# Patient Record
Sex: FEMALE | Race: WHITE | NOT HISPANIC OR LATINO | Employment: UNEMPLOYED | URBAN - METROPOLITAN AREA
[De-identification: names, ages, dates, MRNs, and addresses within clinical notes are randomized per-mention and may not be internally consistent; named-entity substitution may affect disease eponyms.]

---

## 2020-06-22 ENCOUNTER — TELEPHONE (OUTPATIENT)
Dept: HEMATOLOGY/ONCOLOGY | Facility: CLINIC | Age: 85
End: 2020-06-22

## 2020-06-22 NOTE — TELEPHONE ENCOUNTER
LVM with Autumn--Dr. Stacy's nurse re: obtaining apt for pt.  Left contact information to call back

## 2020-06-22 NOTE — TELEPHONE ENCOUNTER
----- Message from Zev Barreto sent at 6/22/2020  2:39 PM CDT -----  Type: Needs Medical Advice  Who Called:  Stefany with   Symptoms (please be specific):    How long has patient had these symptoms:    Pharmacy name and phone #:    Best Call Back Number:   Additional Information: requesting appointment for patient call back to confirm

## 2020-06-23 ENCOUNTER — TELEPHONE (OUTPATIENT)
Dept: HEMATOLOGY/ONCOLOGY | Facility: CLINIC | Age: 85
End: 2020-06-23

## 2020-07-09 NOTE — PROGRESS NOTES
INITIAL CONSULTATION     DATE: 07/23/2020  Patient Name: Kiesha Madera  Referred by: Edgardo Stacy MD  Reason for referral: low blood coutns      Subjective:       Patient ID: Kiesha Madera is a 84 y.o. female.    Chief Complaint: New Patient    HPI      REVIEW OF RECORDS PRIOR TO VISIT SHOW:  03/26/2020-CBC shows white count 3.2, hemoglobin 11.5, platelets 100.  Differential shows 66.8% granulocytes, 22.9% lymphocyte, 7.7% monocyte elevated, years in bases are normal.  The ANC is 2.1 her MCV is 102  06/04/2020-CBC shows white count 3.1, hemoglobin 11.8, platelets 87.  ANC is 2.0, absolute lymphs and monos are low.  Her MCV is 104. Her CMP shows a creatinine of 1.71, calcium low at 8.6, albumin low at 3.3.  TSH is mildly elevated at 5.0 with normal free T4.  Her LDL is 28 and total cholesterol 97.  06/08/2020-annual PCP wellness exam-referred to Hematology    Noted she has a medical history including coronary artery disease low magnesium hypertension diabetes type 2 acid reflux hyperlipidemia DOW (which would be a common etiology of  her low blood counts), bilateral renal stones bradycardia chronic kidney disease stage 4, cirrhosis is mentioned, left carotid bruit, secondary hyperparathyroidism, vitamin-D deficiency.  Noted BMI 24.5, weight 143.      She sees Dr. Mikie gordillo for Nephrology, Dr. Prather:  For cardiology, Dr. Orellana for pulmonary.  She has left AV graft implanted but has never received dialysis.  Complaints of left sciatic pain.  Complaints of chronic hemorrhoids.  Type 2 diabetes taking Tresiba.  Sugars usually range 90 to 120.  Last A1c was 6.3%.  Medications include Breo, multivitamin, magnesium supplement, Tylenol, Zofran, aspirin, atorvastatin, paricalcitol, sodium bicarbonate.    PATIENT REPORTS TODAY ON INITIAL VISIT:    Confirms above to her knowledge with help of sister.   DM since age 51yo - they numbness none, balance none  CKD - they report told this after heart problem  Fistula placed  2016 - has not needed, was ckd 4 now 3.   Pulmonary fibrosis - 4 years - inhyaler, takes ofev with great result.    Chronic pain - yes - left hip, sciatic nerve radiates to foot. Stimulator helps but not much.   This is her biggest active issue in regards to her health, biggest limitation. She states takes 3 hours to mop floor, stops due to pain. Last stimulator worked better.     Diarrhea chronic - medication related SE, imdoium helps.   No blood in stool or urine now - she ahs had blood in urine once, no gross blood, just trace on UA. Kidney stones in past. Urology workup is underway.     No infections.   No major hospitalizations, dehyrdated and has been inpatient for that before.   Stent placed cardiac - that was last hospitalization - 11/2018. One stent.     Easy bruising and bleeding - mild bruising, no bleeding.     No smoking or drinking in past.     Low salt, diabetic diet, added some things back.   Told to increase meat.     Lost weight intentional with diet change - 198lbs    Her sciatic nerve left is her biggest limitation.     She does take a monthly b 12 shot        Past Medical History:   Diagnosis Date    Chronic kidney disease, stage IV (severe)     Chronic renal failure     Coronary artery disease     GERD (gastroesophageal reflux disease)     Hyperlipidemia     Hyperparathyroidism, secondary renal     Hypertension, essential     Hypomagnesemia     Iron deficiency     Type 2 diabetes mellitus with hypoglycemia and coma     Vitamin D deficiency, unspecified      Past Surgical History:   Procedure Laterality Date    APPENDECTOMY      CHOLECYSTECTOMY      KNEE SURGERY      TONSILLECTOMY       Social History     Socioeconomic History    Marital status: Single     Spouse name: Not on file    Number of children: Not on file    Years of education: Not on file    Highest education level: Not on file   Occupational History    Occupation: Retired   Social Needs    Financial resource  "strain: Not on file    Food insecurity     Worry: Not on file     Inability: Not on file    Transportation needs     Medical: Not on file     Non-medical: Not on file   Tobacco Use    Smoking status: Never Smoker    Smokeless tobacco: Never Used   Substance and Sexual Activity    Alcohol use: No    Drug use: No    Sexual activity: Not on file   Lifestyle    Physical activity     Days per week: Not on file     Minutes per session: Not on file    Stress: Not on file   Relationships    Social connections     Talks on phone: Not on file     Gets together: Not on file     Attends Rastafari service: Not on file     Active member of club or organization: Not on file     Attends meetings of clubs or organizations: Not on file     Relationship status: Not on file   Other Topics Concern    Not on file   Social History Narrative    Not on file     Family History   Problem Relation Age of Onset    Coronary artery disease Mother     Hypertension Mother     Diabetes Mother     Hypertension Sister     Hypertension Brother     Diabetes Brother        Current Outpatient Medications   Medication Sig Dispense Refill    ALBUTEROL INHL Inhale into the lungs.      aspirin (ECOTRIN) 81 MG EC tablet Take 81 mg by mouth once daily.      atorvastatin (LIPITOR) 40 MG tablet Take 40 mg by mouth once daily.      BD SAFETYGLIDE SYRINGE 3 mL 23 x 1" Syrg PT TO USE TO ADMINISTER VITAMIN B 12 IM MONTHLY CHANGE NEEDLE AFTER DRAWING UP MED  1    BD ULTRA-FINE MINI PEN NEEDLE 31 gauge x 3/16" Ndle USE AS DIRECTED  4    beta-carotene,A,-vits C,E/mins (VISION-MACIEL ORAL) Take by mouth.      cyanocobalamin, vitamin B-12, 1,000 mcg/mL Kit Inject as directed.      ergocalciferol (ERGOCALCIFEROL) 50,000 unit Cap TAKE 1 CAPSULE (50,000 UNITS TOTAL) BY MOUTH EVERY 30 DAYS. WITH FOOD 3 capsule 3    insulin degludec (TRESIBA FLEXTOUCH U-100 SUBQ) Inject into the skin.      insulin glargine (LANTUS) 100 unit/mL injection Inject into " the skin every evening.      multivitamin/iron/folic acid (COMPLETE WOMEN ORAL) Take by mouth.      nintedanib (OFEV) 100 mg Cap Take by mouth 2 (two) times daily.      omeprazole (PRILOSEC) 20 MG capsule Take 20 mg by mouth once daily.      ondansetron (ZOFRAN) 4 MG tablet Take 4 mg by mouth every 8 (eight) hours as needed.      ondansetron (ZOFRAN-ODT) 4 MG TbDL PLACE 1 TABLETS (4 MG) ON TOP OF THE TONGUE EVERY 8 HOURS 30 tablet 2    sodium bicarbonate 325 MG tablet TAKE 1 TABLET BY MOUTH EVERYDAY AT BEDTIME 90 tablet 3    traMADol (ULTRAM) 50 mg tablet Take 50 mg by mouth every evening.      TRUE METRIX GLUCOSE TEST STRIP Strp TEST 2 TIMES A DAY  3    losartan (COZAAR) 25 MG tablet Take 1 tablet (25 mg total) by mouth every evening. 90 tablet 3     No current facility-administered medications for this visit.      ALLERGIES REVIEWED    Review of Systems    Constitutional: Negative for activity change, appetite change, negative fatigue, fever and unexpected weight change.   HENT: Negative for mouth sores and sore throat.    Respiratory: Negative for cough and shortness of breath.    Cardiovascular: Negative for chest pain, palpitations and leg swelling.   Gastrointestinal: Negative for abdominal pain, constipation and diarrhea.   Genitourinary: Negative for dysuria and frequency.   Musculoskeletal: Negative for back pain and joint swelling.   Neurological: Negative for weakness, light-headedness and numbness.   Hematological: Does not bruise/bleed easily.   Skin: no rash, no lesions, no itching    Objective:      /76 (BP Location: Right arm, Patient Position: Sitting, BP Method: Medium (Automatic))   Pulse 67   Temp 97.7 °F (36.5 °C) (Temporal)   Resp 20   Wt 67.8 kg (149 lb 7.6 oz)   SpO2 100%   BMI 25.66 kg/m²    Wt Readings from Last 25 Encounters:   07/23/20 67.8 kg (149 lb 7.6 oz)   11/01/19 65.8 kg (145 lb)   05/03/19 67.6 kg (149 lb)   10/29/18 67.6 kg (149 lb)   05/18/18 65.3 kg (144  lb)       Physical Exam    Constitutional: She is oriented to person, place, and time. No distress.   HENT:   Mouth/Throat: Oropharynx is clear and moist. No oropharyngeal exudate.   Eyes: Conjunctivae and EOM are normal.   Neck: Normal range of motion. Neck supple.   Cardiovascular: Normal rate, regular rhythm, normal heart sounds and intact distal pulses.    Pulmonary/Chest: Effort normal and breath sounds normal. She has no wheezes. She has no rales.   Abdominal: Soft. Bowel sounds are normal. She exhibits no distension. There is no tenderness.   Musculoskeletal: She exhibits no edema or tenderness.   Lymphadenopathy:     She has no cervical adenopathy.   Neurological: She is alert and oriented to person, place, and time. She has normal strength. No cranial nerve deficit or sensory deficit.   Skin: Skin is warm and dry. No rash noted. No erythema.   Psychiatric: She has a normal mood and affect. Her speech is normal.   Nursing note and vitals reviewed.    No results found for this or any previous visit (from the past 72 hour(s)).    Assessment:       1. Pancytopenia          Patient is a  84 y.o. female here with diagnosis pancytopenia      Discussed differential diagnosis, work-up, in great detail with patient and family.    No major clinical signs of concern, could be related to underlying liver disease, bone marrow dysfunction not ruled out but cytopenias are mild and ANC is normal which is good. Reviewed this at length with patient and sister and reviewed rationale for full lab work-up.           Plan:       Kiesha was seen today for new patient.    Diagnoses and all orders for this visit:    Pancytopenia  -     Ferritin; Future  -     Iron and TIBC; Future  -     Reticulocytes; Future  -     Sedimentation rate; Future  -     C-Reactive Protein; Future  -     Vitamin B12; Future  -     Folate; Future  -     Protein electrophoresis, serum; Future  -     CBC auto differential; Future  -     Pathologist  Interpretation Differential; Future  -     Platelet Count, Blue Top; Future  -     Comprehensive metabolic panel; Future  -     HIV 1/2 Ag/Ab (4th Gen); Future  -     Hepatitis C Antibody; Future  -     Sedimentation rate; Future  -     C-Reactive Protein; Future  -     Lactate Dehydrogenase; Future  -     Haptoglobin; Future  -     Fibrinogen; Future  -     APTT; Future  -     Protime-INR; Future    Other orders  -     Cancel: Direct antiglobulin test; Future  -     Cancel: US Abdomen Limited; Future  -     Cancel: X-Ray Chest PA And Lateral; Future  -     Cancel: D dimer, quantitative; Future            PLAN:    Labs today.     MD vis in 2-3 weeks to review results.     Exercise/nutrition  Important to keep follow-up with PCP and nephro    Discussed plan above in great detail with patient and sister and all questions answered to their satisfaction. Proceed with plan above.       Thank you for the referral. Please call with any questions.           Jessica Chu M.D.  Hematology Oncology  [facility]

## 2020-07-23 ENCOUNTER — OFFICE VISIT (OUTPATIENT)
Dept: HEMATOLOGY/ONCOLOGY | Facility: CLINIC | Age: 85
End: 2020-07-23
Payer: MEDICARE

## 2020-07-23 ENCOUNTER — LAB VISIT (OUTPATIENT)
Dept: LAB | Facility: HOSPITAL | Age: 85
End: 2020-07-23
Attending: INTERNAL MEDICINE
Payer: MEDICARE

## 2020-07-23 VITALS
DIASTOLIC BLOOD PRESSURE: 76 MMHG | BODY MASS INDEX: 25.66 KG/M2 | HEART RATE: 67 BPM | RESPIRATION RATE: 20 BRPM | SYSTOLIC BLOOD PRESSURE: 126 MMHG | TEMPERATURE: 98 F | OXYGEN SATURATION: 100 % | WEIGHT: 149.5 LBS

## 2020-07-23 DIAGNOSIS — D61.818 PANCYTOPENIA: Primary | ICD-10-CM

## 2020-07-23 DIAGNOSIS — D61.818 PANCYTOPENIA: ICD-10-CM

## 2020-07-23 LAB
ALBUMIN SERPL BCP-MCNC: 3.6 G/DL (ref 3.5–5.2)
ALP SERPL-CCNC: 83 U/L (ref 38–145)
ALT SERPL W/O P-5'-P-CCNC: 19 U/L (ref 0–35)
ANION GAP SERPL CALC-SCNC: 4 MMOL/L (ref 8–16)
APTT PPP: 33.4 SEC (ref 24.6–36.7)
AST SERPL-CCNC: 51 U/L (ref 14–36)
BASOPHILS # BLD AUTO: 0.01 K/UL (ref 0–0.2)
BASOPHILS NFR BLD: 0.2 % (ref 0–1.9)
BILIRUB SERPL-MCNC: 0.8 MG/DL (ref 0.2–1.3)
BUN SERPL-MCNC: 26 MG/DL (ref 7–18)
CALCIUM SERPL-MCNC: 9.6 MG/DL (ref 8.4–10.2)
CHLORIDE SERPL-SCNC: 112 MMOL/L (ref 95–110)
CO2 SERPL-SCNC: 23 MMOL/L (ref 22–31)
CREAT SERPL-MCNC: 1.74 MG/DL (ref 0.5–1.4)
CRP SERPL-MCNC: <0.5 MG/DL (ref 0–0.9)
CRP SERPL-MCNC: <0.5 MG/DL (ref 0–0.9)
DIFFERENTIAL METHOD: ABNORMAL
EOSINOPHIL # BLD AUTO: 0.1 K/UL (ref 0–0.5)
EOSINOPHIL NFR BLD: 2.4 % (ref 0–8)
ERYTHROCYTE [DISTWIDTH] IN BLOOD BY AUTOMATED COUNT: 14.6 % (ref 11.5–14.5)
ERYTHROCYTE [SEDIMENTATION RATE] IN BLOOD BY PHOTOMETRIC METHOD: 25 MM/HR (ref 0–29)
ERYTHROCYTE [SEDIMENTATION RATE] IN BLOOD BY PHOTOMETRIC METHOD: 25 MM/HR (ref 0–29)
EST. GFR  (AFRICAN AMERICAN): 31 ML/MIN/1.73 M^2
EST. GFR  (NON AFRICAN AMERICAN): 27 ML/MIN/1.73 M^2
FERRITIN SERPL-MCNC: 66 NG/ML (ref 12–264)
FIBRINOGEN PPP-MCNC: 411 MG/DL (ref 234–538)
FOLATE SERPL-MCNC: >20 NG/ML (ref 4–24)
GLUCOSE SERPL-MCNC: 108 MG/DL (ref 70–110)
HAPTOGLOB SERPL-MCNC: 73 MG/DL (ref 30–200)
HCT VFR BLD AUTO: 38.1 % (ref 37–48.5)
HCV AB SERPL QL IA: NEGATIVE
HGB BLD-MCNC: 11.5 G/DL (ref 12–16)
IMM GRANULOCYTES # BLD AUTO: 0.01 K/UL (ref 0–0.04)
IMM GRANULOCYTES NFR BLD AUTO: 0.2 % (ref 0–0.5)
INR PPP: 1.3
IRON SATN MFR SERPL: 27 % (ref 20–50)
IRON SERPL-MCNC: 70 UG/DL (ref 30–160)
LDH SERPL L TO P-CCNC: 546 U/L (ref 313–618)
LYMPHOCYTES # BLD AUTO: 1 K/UL (ref 1–4.8)
LYMPHOCYTES NFR BLD: 24.3 % (ref 18–48)
MCH RBC QN AUTO: 33.2 PG (ref 27–31)
MCHC RBC AUTO-ENTMCNC: 30.2 G/DL (ref 32–36)
MCV RBC AUTO: 110 FL (ref 82–98)
MONOCYTES # BLD AUTO: 0.3 K/UL (ref 0.3–1)
MONOCYTES NFR BLD: 8 % (ref 4–15)
NEUTROPHILS # BLD AUTO: 2.8 K/UL (ref 1.8–7.7)
NEUTROPHILS NFR BLD: 64.9 % (ref 38–73)
NRBC BLD-RTO: 0 /100 WBC
PATH REV BLD -IMP: NORMAL
PLATELET # BLD AUTO: 85 K/UL (ref 150–350)
PMV BLD AUTO: 10 FL (ref 9.2–12.9)
POTASSIUM SERPL-SCNC: 4.8 MMOL/L (ref 3.5–5.1)
PROT SERPL-MCNC: 6.4 G/DL (ref 6–8.4)
PROTHROMBIN TIME: 15.8 SEC (ref 11.8–14.7)
RBC # BLD AUTO: 3.46 M/UL (ref 4–5.4)
RETICS/RBC NFR AUTO: 1.3 % (ref 0.5–2.5)
SODIUM SERPL-SCNC: 139 MMOL/L (ref 136–145)
TOTAL IRON BINDING CAPACITY: 263 UG/DL (ref 265–497)
VIT B12 SERPL-MCNC: >1000 PG/ML (ref 210–950)
WBC # BLD AUTO: 4.24 K/UL (ref 3.9–12.7)

## 2020-07-23 PROCEDURE — 84165 PROTEIN E-PHORESIS SERUM: CPT | Mod: 26,,, | Performed by: PATHOLOGY

## 2020-07-23 PROCEDURE — 85652 RBC SED RATE AUTOMATED: CPT

## 2020-07-23 PROCEDURE — 85652 RBC SED RATE AUTOMATED: CPT | Mod: PN

## 2020-07-23 PROCEDURE — 86803 HEPATITIS C AB TEST: CPT

## 2020-07-23 PROCEDURE — 85045 AUTOMATED RETICULOCYTE COUNT: CPT | Mod: PN

## 2020-07-23 PROCEDURE — 82746 ASSAY OF FOLIC ACID SERUM: CPT

## 2020-07-23 PROCEDURE — 83010 ASSAY OF HAPTOGLOBIN QUANT: CPT | Mod: PN

## 2020-07-23 PROCEDURE — 85025 COMPLETE CBC W/AUTO DIFF WBC: CPT | Mod: PN

## 2020-07-23 PROCEDURE — 85384 FIBRINOGEN ACTIVITY: CPT | Mod: PN

## 2020-07-23 PROCEDURE — 85730 THROMBOPLASTIN TIME PARTIAL: CPT | Mod: PN

## 2020-07-23 PROCEDURE — 85025 COMPLETE CBC W/AUTO DIFF WBC: CPT

## 2020-07-23 PROCEDURE — 80053 COMPREHEN METABOLIC PANEL: CPT

## 2020-07-23 PROCEDURE — 82728 ASSAY OF FERRITIN: CPT | Mod: PN

## 2020-07-23 PROCEDURE — 86140 C-REACTIVE PROTEIN: CPT | Mod: PN

## 2020-07-23 PROCEDURE — 83615 LACTATE (LD) (LDH) ENZYME: CPT

## 2020-07-23 PROCEDURE — 99204 OFFICE O/P NEW MOD 45 MIN: CPT | Mod: S$PBB,,, | Performed by: INTERNAL MEDICINE

## 2020-07-23 PROCEDURE — 84165 PROTEIN E-PHORESIS SERUM: CPT

## 2020-07-23 PROCEDURE — 99999 PR PBB SHADOW E&M-EST. PATIENT-LVL III: ICD-10-PCS | Mod: PBBFAC,,, | Performed by: INTERNAL MEDICINE

## 2020-07-23 PROCEDURE — 85610 PROTHROMBIN TIME: CPT | Mod: PN

## 2020-07-23 PROCEDURE — 83615 LACTATE (LD) (LDH) ENZYME: CPT | Mod: PN

## 2020-07-23 PROCEDURE — 82746 ASSAY OF FOLIC ACID SERUM: CPT | Mod: PN

## 2020-07-23 PROCEDURE — 85730 THROMBOPLASTIN TIME PARTIAL: CPT

## 2020-07-23 PROCEDURE — 82607 VITAMIN B-12: CPT

## 2020-07-23 PROCEDURE — 83540 ASSAY OF IRON: CPT | Mod: PN

## 2020-07-23 PROCEDURE — 85384 FIBRINOGEN ACTIVITY: CPT

## 2020-07-23 PROCEDURE — 84165 PATHOLOGIST INTERPRETATION SPE: ICD-10-PCS | Mod: 26,,, | Performed by: PATHOLOGY

## 2020-07-23 PROCEDURE — 82728 ASSAY OF FERRITIN: CPT

## 2020-07-23 PROCEDURE — 85610 PROTHROMBIN TIME: CPT

## 2020-07-23 PROCEDURE — 99213 OFFICE O/P EST LOW 20 MIN: CPT | Mod: PBBFAC,PN | Performed by: INTERNAL MEDICINE

## 2020-07-23 PROCEDURE — 99999 PR PBB SHADOW E&M-EST. PATIENT-LVL III: CPT | Mod: PBBFAC,,, | Performed by: INTERNAL MEDICINE

## 2020-07-23 PROCEDURE — 86140 C-REACTIVE PROTEIN: CPT

## 2020-07-23 PROCEDURE — 99204 PR OFFICE/OUTPT VISIT, NEW, LEVL IV, 45-59 MIN: ICD-10-PCS | Mod: S$PBB,,, | Performed by: INTERNAL MEDICINE

## 2020-07-23 PROCEDURE — 85045 AUTOMATED RETICULOCYTE COUNT: CPT

## 2020-07-23 PROCEDURE — 82607 VITAMIN B-12: CPT | Mod: PN

## 2020-07-23 PROCEDURE — 80053 COMPREHEN METABOLIC PANEL: CPT | Mod: PN

## 2020-07-23 PROCEDURE — 86803 HEPATITIS C AB TEST: CPT | Mod: PN

## 2020-07-23 PROCEDURE — 83010 ASSAY OF HAPTOGLOBIN QUANT: CPT

## 2020-07-23 PROCEDURE — 86703 HIV-1/HIV-2 1 RESULT ANTBDY: CPT

## 2020-07-23 PROCEDURE — 83540 ASSAY OF IRON: CPT

## 2020-07-23 PROCEDURE — 36415 COLL VENOUS BLD VENIPUNCTURE: CPT | Mod: PN

## 2020-07-23 NOTE — LETTER
July 23, 2020      Edgardo Stacy MD  [address] [facility] [address]           [facility] [address] [address]  Phone: [phone]  Fax: [phone]          Patient: Kiesha Madera   MR Number: [id number]   YOB: 1935   Date of Visit: 7/23/2020       Dear Dr. Edgardo Stacy:    Thank you for referring Kiesha Madera to me for evaluation. Attached you will find relevant portions of my assessment and plan of care.    If you have questions, please do not hesitate to call me. I look forward to following Kiesha Madera along with you.    Sincerely,    Jessica Chu MD    Enclosure  CC:  No Recipients    If you would like to receive this communication electronically, please contact [email or web address] or [phone] to request more information on [employer] access.    For providers and/or their staff who would like to refer a patient to [facility], please contact us through our one-stop-shop provider referral line, [facility], at [phone].    If you feel you have received this communication in error or would no longer like to receive these types of communications, please e-mail [email or web address]

## 2020-07-27 LAB
ALBUMIN SERPL ELPH-MCNC: 3.37 G/DL (ref 3.35–5.55)
ALPHA1 GLOB SERPL ELPH-MCNC: 0.31 G/DL (ref 0.17–0.41)
ALPHA2 GLOB SERPL ELPH-MCNC: 0.76 G/DL (ref 0.43–0.99)
B-GLOBULIN SERPL ELPH-MCNC: 0.75 G/DL (ref 0.5–1.1)
GAMMA GLOB SERPL ELPH-MCNC: 1.02 G/DL (ref 0.67–1.58)
HIV 1+2 AB+HIV1 P24 AG SERPL QL IA: NEGATIVE
PROT SERPL-MCNC: 6.2 G/DL (ref 6–8.4)

## 2020-07-28 LAB — PATHOLOGIST INTERPRETATION SPE: NORMAL

## 2020-08-03 LAB — PATH REV BLD -IMP: NORMAL

## 2022-06-10 PROBLEM — E11.9 CONTROLLED TYPE 2 DIABETES MELLITUS WITHOUT COMPLICATION, WITH LONG-TERM CURRENT USE OF INSULIN: Status: ACTIVE | Noted: 2022-06-10

## 2022-06-10 PROBLEM — E55.9 VITAMIN D DEFICIENCY: Status: ACTIVE | Noted: 2022-06-10

## 2022-06-10 PROBLEM — N25.81 SECONDARY HYPERPARATHYROIDISM: Status: ACTIVE | Noted: 2022-06-10

## 2022-06-10 PROBLEM — E83.42 HYPOMAGNESEMIA: Status: ACTIVE | Noted: 2022-06-10

## 2022-06-10 PROBLEM — Z79.4 CONTROLLED TYPE 2 DIABETES MELLITUS WITHOUT COMPLICATION, WITH LONG-TERM CURRENT USE OF INSULIN: Status: ACTIVE | Noted: 2022-06-10

## 2022-06-10 PROBLEM — N18.4 CKD (CHRONIC KIDNEY DISEASE) STAGE 4, GFR 15-29 ML/MIN: Status: ACTIVE | Noted: 2022-06-10

## 2022-06-10 PROBLEM — D63.1 ANEMIA OF CHRONIC RENAL FAILURE, STAGE 4 (SEVERE): Status: ACTIVE | Noted: 2022-06-10

## 2022-06-10 PROBLEM — I10 ESSENTIAL HYPERTENSION: Status: ACTIVE | Noted: 2022-06-10

## 2022-08-11 PROBLEM — J84.112 IDIOPATHIC PULMONARY FIBROSIS: Status: ACTIVE | Noted: 2022-08-11

## 2022-09-09 PROBLEM — K21.9 GASTROESOPHAGEAL REFLUX DISEASE WITHOUT ESOPHAGITIS: Status: ACTIVE | Noted: 2022-09-09

## 2022-09-09 PROBLEM — N18.30 STAGE 3 CHRONIC KIDNEY DISEASE: Status: ACTIVE | Noted: 2022-09-09

## 2022-09-09 PROBLEM — R06.02 SOB (SHORTNESS OF BREATH): Status: ACTIVE | Noted: 2022-09-09

## 2022-11-14 PROBLEM — J84.112 IDIOPATHIC PULMONARY FIBROSIS: Status: RESOLVED | Noted: 2022-08-11 | Resolved: 2022-11-14

## 2023-04-17 PROBLEM — J84.112 IDIOPATHIC PULMONARY FIBROSIS: Status: RESOLVED | Noted: 2022-08-11 | Resolved: 2023-04-17

## 2023-04-18 PROBLEM — I25.10 CORONARY ARTERY DISEASE INVOLVING NATIVE CORONARY ARTERY OF NATIVE HEART WITHOUT ANGINA PECTORIS: Status: ACTIVE | Noted: 2023-04-18

## 2023-07-24 PROBLEM — J84.112 IDIOPATHIC PULMONARY FIBROSIS: Status: RESOLVED | Noted: 2022-08-11 | Resolved: 2023-07-24

## 2024-01-22 PROBLEM — J84.112 IDIOPATHIC PULMONARY FIBROSIS: Status: RESOLVED | Noted: 2022-08-11 | Resolved: 2024-01-22
